# Patient Record
(demographics unavailable — no encounter records)

---
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
## 2017-12-29 NOTE — PDOC.PN
- Subjective


Encounter Start Date: 12/29/17


Encounter Start Time: 11:24





Ms. Morris was seen today in follow-up of chest pain. She says she feels much 

better, the chest pain has completely resolved. She denies any shortness of 

breath. she says she feels normal.





- Objective


Resuscitation Status: 


 











Resuscitation Status           FULL:Full Resuscitation














MAR Reviewed: Yes


Vital Signs & Weight: 


 Vital Signs (12 hours)











  Temp Pulse Resp BP Pulse Ox


 


 12/29/17 09:17  98.7 F  59 L  16  


 


 12/29/17 07:07  98.7 F  59 L  16  131/72  94 L


 


 12/29/17 03:15  98.9 F  62  18  118/56 L  96








 Weight











Weight                         150 lb 8 oz














I&O: 


 











 12/28/17 12/29/17 12/30/17





 06:59 06:59 06:59


 


Intake Total  240 


 


Balance  240 











Result Diagrams: 


 12/29/17 03:53





 12/29/17 03:53





Phys Exam





- Physical Examination


HEENT: PERRLA


Respiratory: no wheezing, no rales, no rhonchi, clear to auscultation bilateral


Cardiovascular: RRR, no significant murmur


Gastrointestinal: soft, non-tender, positive bowel sounds


Musculoskeletal: no edema





Dx/Plan


(1) Chest pain


Code(s): R07.9 - CHEST PAIN, UNSPECIFIED   Status: Acute   





(2) Aortic stenosis, moderate


Code(s): I35.0 - NONRHEUMATIC AORTIC (VALVE) STENOSIS   Status: Chronic   





(3) Hypertension


Code(s): I10 - ESSENTIAL (PRIMARY) HYPERTENSION   Status: Chronic   





- Plan





* Chest Pain- resolved. This is atypical, and suspected musculo-skeletal- 

troponins are negative


* Await Echo


* HTN- blood pressure is stable


* Likely discharge later today.

## 2018-01-18 NOTE — EKG
Test Reason : CHEST PAIN

Blood Pressure : ***/*** mmHG

Vent. Rate : 061 BPM     Atrial Rate : 061 BPM

   P-R Int : 182 ms          QRS Dur : 132 ms

    QT Int : 510 ms       P-R-T Axes : 052 074 012 degrees

   QTc Int : 513 ms

 

Normal sinus rhythm

Non-specific intra-ventricular conduction block

T wave abnormality, consider inferior ischemia

Abnormal ECG

 

Confirmed by MARSHA LUCAS ([id number]),  JAQUELINE OCHOA ([id number]) on 1/18/2018 3:24:41 PM

 

Referred By:             Confirmed By:MARSHA LUCAS

## 2018-04-02 NOTE — EKG
Test Reason : 

Blood Pressure : ***/*** mmHG

Vent. Rate : 062 BPM     Atrial Rate : 062 BPM

   P-R Int : 180 ms          QRS Dur : 130 ms

    QT Int : 478 ms       P-R-T Axes : 069 088 042 degrees

   QTc Int : 485 ms

 

Normal sinus rhythm

Non-specific intra-ventricular conduction block

Nonspecific T wave abnormality

Abnormal ECG

When compared with ECG of 28-DEC-2017 19:02,

No significant change was found

Confirmed by KANIKA LEW M.D. ([id number]) on 4/2/2018 10:09:30 PM

 

Referred By:  ANA           Confirmed By:KANIKA LEW M.D.

## 2018-04-17 NOTE — CON
DATE OF CONSULTATION:  04/17/2018

 

HISTORY OF PRESENT ILLNESS:  Ms. Morris is an 83-year-old woman, who was brought in for elective car
diac catheterization today.  She has history of aortic stenosis, which has been followed by Dr. Margarita min in [place].  She had a recent echo in 12/2017, which showed a moderate to severe aortic stenosis
 and an ejection fraction of 55% to 60%.  Since that time, she has had progressive shortness of breat
h.  She was brought in today for cardiac catheterization, which revealed severe LAD and OM branch mia
noses.  Her valve data at the time of catheterization showed a peak gradient of 49, a mean gradient o
f 41, and an aortic valve area of 0.42.  I have been asked to see her to discuss aortic valve replace
ment and coronary artery bypass grafting.

 

PAST MEDICAL HISTORY:

1.  Hypertension.

2.  Aortic stenosis.

3.  Coronary artery disease.

 

PAST SURGICAL HISTORY:

1.  Tonsillectomy.

2.  Bilateral greater saphenous vein stripping for varicose veins.

 

MEDICATIONS AT HOME:

1.  Amlodipine 5 mg every day.

2.  Hydrochlorothiazide 12.5 mg daily.

3.  Quinapril 20 mg every day.

 

ALLERGIES:  None.

 

SOCIAL HISTORY:  She lives at home in [facility] with her .  Her daughter lives across the street.
  She continues to do most of her daily activities until shortness of breath has begun to limit her a
ctivity.

 

REVIEW OF SYSTEMS:  Ten point review of systems is performed and is negative except as above.

 

PHYSICAL EXAMINATION:

GENERAL:  This is a well-developed, well-nourished woman resting comfortably post-catheterization.

VITAL SIGNS:  Her heart rate is 78 and regular, blood pressure is 120/70.

HEENT:  Sclerae nonicteric.  Pupils equal and round bilaterally.

NECK:  Supple.  There is no adenopathy.

LUNGS:  Chest is clear bilaterally.

HEART:  Rhythm is regular.  She has a harsh systolic ejection murmur heard throughout precordium.

ABDOMEN:  Soft and nontender.

EXTREMITIES:  No cyanosis, clubbing or edema.

VASCULAR:  She has palpable carotid, radial, femoral and dorsalis pedis pulses bilaterally.  She does
 have scars from greater saphenous vein stripping in the past.  Radial artery Aries's test was perfor
med in the left arm and her left radial artery is adequate for harvest with good ulnar flow.

 

LABORATORY DATA:  Of note, hemoglobin is 12.0, platelet count 204,000.  Creatinine is 0.9, potassium 
is 3.5.  PT/INR is 1.1.  I have reviewed her chest x-ray, which is clear with no dominant lung mass. 
 She does have a thin mediastinum.

 

ASSESSMENT AND PLAN:  This is a pleasant 83-year-old woman with severe aortic stenosis and 2-vessel c
oronary artery disease.  I have discussed aortic valve replacement with bioprosthetic valve and coron
adriana artery bypass grafting, probably with a mammary artery to left anterior descending and left radia
l artery to her obtuse marginal.  Risks, benefits, and options have been outlined with her and she is
 agreeable to proceed.

## 2018-04-24 NOTE — RAD
RADIOGRAPH CHEST 1 VIEW:

 

Date:  04/24/2018

Time:  11:21 a.m.

 

HISTORY:

An 83-year-old female, status post open heart surgery.

 

COMPARISON:

12/28/2017

 

FINDINGS:

Multiple new placements:  Prosthetic cardiac valve, sternotomy wires, one or two left paramedian ches
t tubes, right subclavian central venous catheter with the distal tip overlying the right atrium, and
 endotracheal tube in mid thoracic trachea, approximately 3.5 to 4 cm superior to the anjana.  No pul
monary edema.  New focus of subsegmental atelectasis in the left upper lobe.  The rest of the lungs a
re clear.  This is a supine image, which would be insensitive for pneumothorax detection.  There is a
nother new finding of mild elevation of the left hemidiaphragm.

 

IMPRESSION:

1.  Status post open heart surgery with placement of new prosthetic cardiac valve.

2.  Life support lines, as listed above.

3.  Other than subsegmental atelectasis of the left upper lobe, the lungs are clear.

4.  New mild elevation of left hemidiaphragm.

 

HAFSA []

 

POS: PATTI

## 2018-04-24 NOTE — OP
DATE OF OPERATION:  04/24/2018

 

PREOPERATIVE DIAGNOSES:  Aortic stenosis/coronary artery disease.

 

POSTOPERATIVE DIAGNOSES:  Aortic stenosis/coronary artery disease.

 

PROCEDURES:

1.  Aortic valve replacement with #19 Magna bioprosthetic valve.

2.  Coronary artery bypass grafting x2 - left internal mammary artery 1.25-mm LAD - good conduit targ
et.

3.  Left radial artery 1.25-mm OM1 - good conduit target.

4.  Left radial artery harvest.

 

SURGEON:  Dr. Eliazar Acharya, Dr. Maury Lee.

 

ANESTHESIA:  General endotracheal - Dr. Kale Draper.

 

PUMP TIME:  98 minutes.

 

CROSS-CLAMP TIME:  80 minutes.

 

LOW CORE TEMPERATURE:  32-degree Celsius.

 

PERFUSIONIST:  Vidya.

 

DRAINS:  24-[place] chest tubes x2.

 

DRIPS:  Levophed at 9 mcg per minute.

 

TRANSFUSIONS:  One platelet pack.

 

DESCRIPTION OF PROCEDURE:  After consent was obtained, patient was brought to operating room and plac
ed in supine on the operating room table.  Appropriate anesthetic monitor was placed and general endo
tracheal anesthesia induced.  Chest, abdomen, and legs and left arm were prepped and draped in usual 
sterile fashion.  Patient had previously undergone bilateral greater saphenous vein stripping.

 

Left radial artery was harvested as a pedicle graft.  Wounds were irrigated and closed in layers.  St
aples were placed in the skin due to the thin nature of her epidermis.  Sterile dressing was applied 
and the arm tucked.  Median sternotomy was performed.  Left internal mammary artery was harvested as 
a pedicle graft.  Patient was systemically heparinized.  Distal pedicle was divided and infused with 
papaverine.  Thymic fat and pericardium were divided with electrocautery.  Pericardial stay sutures w
ere placed.  Aortic and atrial cannulation performed.  After adequate heparinization, retrograde prim
e was performed and patient was placed on cardiopulmonary bypass.  Left ventricular sump drain was pl
aced to the right superior pulmonary vein.  Distal targets were marked.  Aortic cross-clamp was appli
ed, antegrade sanguinous cardioplegic arrest obtained.  One liter of antegrade cold cardioplegia was 
given.  Topical cold solution was used.  Left radial artery was anastomosed to the OM1 in end-to-side
 fashion with running 7-0 Prolene suture.  Pedicle screw with interrupted 6-0 Prolene suture.  Mammar
y artery was brought through a window in the pericardium and anastomosed to the distal LAD in end-to-
side fashion with running 7-0 Prolene suture.  Anastomosis was briefly tested and was hemostatic.  Pe
dicle screw was interrupted with 6-0 Prolene suture.  A 500 mL of antegrade [place] cardioplegia was
 given.  A transverse hockey stick aortotomy was performed.  Aortic stay sutures were placed.  Valve 
was inspected.  It was a three-leaflet valve that was heavily calcified.  Leaflets were debrided and 
annulus decalcified.  Annulus measured at #19.  A 19-Magna bioprosthetic valve was selected and washe
d.  Pledgeted 2-0 Ethibond sutures were placed in the annulus.  These sutures were passed through the
 sewing ring and the valve was seated.  Valve was secured with 4 knots.  Valve seated nicely and core
 knots were all secured adequately.  The CO2 was infused in the pericardial well throughout the open 
portion of the procedure.  Aortotomy was closed in 2-layer running fashion with pledgeted 4-0 Prolene
 suture.  Aortotomy was then treated with BioGlue.  Slit was made in the aorta.  The radial artery wa
s anastomosed to the aorta with running 7-0 Prolene suture.  After adequate deairing, patient was nam
liza in Trendelenburg position.  Cross-clamp was removed.  Ventricular pacing wires were placed.  The 
patient was warmed and weaned from cardiopulmonary bypass.  After resumption of sinus rhythm, good he
modynamics, temperature greater than 36.5, bypass was discontinued.  Transfusions were given.  Decann
ulation was performed and pursestring sutures secured.  Atrial and aortic cannulation sites were secu
red with their pursestrings along with a pledgeted at 4-0 Prolene suture.  Aortic roots, vent site wa
s secured with pledgeted 4-0 Prolene suture.  Sump was removed and its pursestring sutures secured.  
Protamine was administered.  Hemostasis was ensured.  Platelet pack was administered.  A 24-[place] st
raight chest tubes were placed in mediastinum x2.  After adequate hemostasis had been obtained, smith
um was treated with vancomycin paste.  Sternum was closed with #5 wire.  Sternum was treated with nam
telet-rich plasma and wires twisted.  Wounds irrigated and treated with platelet-poor plasma, closed 
in multiple layers.  Needle, sponge, and instrument counts were all reported as correct at the end of
 the procedure.  Patient was transferred to the intensive care unit in stable, but critical condition
.

## 2018-04-24 NOTE — EKG
Test Reason : POST CABG

Blood Pressure : ***/*** mmHG

Vent. Rate : 087 BPM     Atrial Rate : 087 BPM

   P-R Int : 166 ms          QRS Dur : 132 ms

    QT Int : 402 ms       P-R-T Axes : 055 078 090 degrees

   QTc Int : 483 ms

 

Normal sinus rhythm

Right bundle branch block

ST elevation, consider early repolarization, pericarditis, or injury

Abnormal ECG

Confirmed by PJ ALEMAN ([id number]) on 4/24/2018 3:56:34 PM

 

Referred By: MICHELLE ADEN           Confirmed By:PJ ALEMAN

## 2018-04-25 NOTE — CON
DATE OF CONSULTATION:  04/24/2018

 

REASON FOR CONSULTATION:  Assist with cardiac management status post AVR and bypass surgery.

 

HISTORY OF PRESENT ILLNESS:  Ms. Morris is a pleasant 83-year-old woman who I have seen and evaluate
d in the past.  She has a history of CAD with a recent angiogram performed  last week where she was f
ound to have severe stenosis of the LAD and OM branch.  She was also found to have a severe aortic st
enosis with an aortic valve area of 0.42.

 

She underwent 2-vessel bypass and AVR with a 19 mm valve.  She was seen and evaluated in the postoper
ative period.  She is currently extubated.  Her only complaint is constipation.  She is on low dose n
orepinephrine.  Her pacemaker leads have been removed and her chest tube has also been removed.

 

PAST MEDICAL HISTORY:  Hypertension.

 

FAMILY HISTORY:  Negative for CAD.

 

SOCIAL HISTORY:  She is currently  with children.  No current tobacco or alcohol use.

 

CURRENT MEDICATIONS:  Hydrochlorothiazide, MiraLax, melatonin, vitamin C, CoQ10, calcium, aspirin, fo
lic acid, amlodipine, and omega 3 fatty acid, Accupril, and isosorbide.

 

REVIEW OF SYSTEMS:  Ten point review of systems reviewed and as above, otherwise negative.

 

PHYSICAL EXAMINATION:

VITAL SIGNS:  Blood pressure 117/75, pulse 77, temperature afebrile.

GENERAL:  Patient is a pleasant female who is in no acute distress. The patient appears her stated ag
e.

NEUROLOGIC:  The patient is alert and oriented times 3 with no focal neurologic deficits.

HEENT:  Sclerae without icterus.  Mouth has moist mucous membranes with normal pallor.

NECK:  No JVD.  Carotid upstroke brisk.  No bruits bilaterally.

LUNGS:  Clear to auscultation with unlabored respirations.

BACK:  No scoliosis or kyphosis.

CARDIAC:  Regular rate and rhythm with a pericardial rub. 

ABDOMEN:  Soft, nontender, nondistended.  No peritoneal signs present. No hepatosplenomegaly.  No abn
ormal striae.

EXTREMITIES:  2+ femoral and 2+ dorsalis pedis pulses.  No cyanosis, clubbing, or edema.

SKIN:  No gross abnormalities.

 

PERTINENT LABS:  Hemoglobin 9.7, creatinine 0.85.

 

IMPRESSION:

1.  Coronary artery disease.

2.  Severe AS.

3.  Status post aortic valve replacement and bypass surgery.

 

RECOMMENDATIONS:  Ms. Morris continues to be on low dose pressor agents.  We will defer beta blocker
s until she is off pressor agents and is maintaining appropriate blood pressure.  She currently has D
ulcolax p.r.n. for constipation.  Continue aspirin.
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
## 2018-04-25 NOTE — RAD
CHEST ONE VIEW:

 

History: Open heart surgery. 

 

Comparison: Chest radiograph prior day. 

 

FINDINGS: 

Patient has been extubated. Right IJ central venous catheter is similar. There are atelectatic change
s in the left lung. No large pneumothorax is appreciated. 

 

IMPRESSION: 

Expected post-operative findings without complication. 

 

POS: [facility]

## 2018-04-26 NOTE — PRG
DATE OF SERVICE:  04/26/2018

 

Ms. Morris said she is feeling well.  She is sitting up in a chair.  

 

PHYSICAL EXAMINATION: 

VITAL SIGNS:  She is afebrile.  She is on 2 liter cannula.  Heart rate 78,  blood pressure 101/61.

LUNGS:  Remarkable for fine crackles at her bases.

HEART:  Regular rhythm.  S1 and S2 are normal.  She does not have a loud S3 consistent with her biopr
osthetic valve.  

ABDOMEN:  Abdomen is soft and nontender.

EXTREMITIES:  Warm without edema.

 

LABORATORY DATA:  White count 10.9, hemoglobin 9.7, platelets 85,000.

 

Sodium 134, creatinine 4.8, chloride 108, bicarbonate 20, BUN 39, creatinine 2.11.

 

IMPRESSION:

1.  Status post aortic valve and coronary bypass grafting.

2.  Renal insufficiency.  Her intake and output is positive 595.

 

Her urine output has only been 5 mL an hour since midnight.

 

She probably needs more volume.  This will need to be monitored closely.

## 2018-04-26 NOTE — RAD
CHEST 1 VIEW:

 

Date:  04/26/18 

 

HISTORY:  

Chest surgery. Follow-up. 

 

COMPARISON:  

04/25/18. 

 

FINDINGS:

Cardiac silhouette is magnified and enlarged. Pulmonary vasculature is upper limits of normal. Opacit
y at the right base has increased slightly with the appearance of pleural fluid. Bibasilar atelectasi
s is otherwise stable. Mediastinum is midline with postoperative changes and a right internal jugular
 central venous catheter. No lobar consolidation or evidence of pneumothorax. 

 

IMPRESSION: 

Slight interval increase in right pleural fluid. Otherwise stable postoperative appearance of the yifan
st. 

 

 

POS: [facility]

## 2018-04-26 NOTE — PRG
DATE OF SERVICE:  04/26/2018

 

SUBJECTIVE:  Ms. Morris is doing well.  She was seen ambulating.  No current complaints.

 

PHYSICAL EXAMINATION:

VITAL SIGNS:  Blood pressure 124/67, pulse 69, temperature 97.8.

LUNGS:  Clear to auscultation.

HEART:  Regular rate and rhythm.

ABDOMEN:  Soft, nontender, nondistended.

EXTREMITIES:  No edema.

 

IMPRESSION:

1.  Coronary artery disease status post bypass surgery.

2.  Aortic stenosis, status post aortic valve replacement.

 

RECOMMENDATIONS:  Patient currently on aspirin.  We will add low dose beta blocker therapy and statin
 treatment.  Continue ambulation and incentive spirometry.

## 2018-04-27 NOTE — PDOC.CTH
Cardiology Progress Note





- Subjective





No complaints today. In bed about to get to chair. 





- ROS


shortness of breath





- Objective


 Vital Signs











  Temp Pulse Resp Pulse Ox


 


 04/27/18 08:00  98.4 F  79  19  93 L


 


 04/27/18 07:20     94 L


 


 04/27/18 04:00  97.9 F   








 











Weight                         164 lb 7.437 oz














 











 04/26/18 04/27/18 04/28/18





 06:59 06:59 06:59


 


Intake Total 1045 508.4 200


 


Output Total 450 168 530


 


Balance 595 340.4 -330














- Physical Examination


General/Neuro: alert & oriented x3, NAD


Lungs: other: (decreased BS at bases; no wheezing or rhonchi)


Heart: RRR


Abdomen: NT/ND


Extremities: + edema B





- Telemetry


Telemetry Rhythm: SR 70s-90s





- Labs


Result Diagrams: 


 04/27/18 04:40





 04/27/18 04:40





- Assessment/Plan





1. CAD s/p CABG x 2 with LIMA-LAD and left radial to D1


2. Severe AS s/p AVR


3. DEVON - Off vasopressin and with minimal urine output. Continue to monitor. 

Lasix given x 1 today.


4. Post-op AFib - after given dopamine. Now remains in NSR





Slow progress. Rhythm stable. Third spaced, but now with improved urine output 

after lasix. Continue to monitor closely. Up to chair.

## 2018-04-27 NOTE — PQF
CLINICAL DOCUMENTATION IMPROVEMENT CLARIFICATION FORM:  ICD-10 Updated



PLEASE DO AN ADDENDUM TO THE PROGRESS NOTE WITH ANY DOCUMENTATION UPDATES OR 
ADDITIONS AND CARRY THROUGH TO DC SUMMARY.   THANK YOU.



DATE:  4/27                                                                     
ATTN:  DR. SAMMIE ADEN



Please exercise your independent, professional judgment in responding to the 
clarification form. Clinical indicators are provided on the bottom of this form 
for your review



Please check appropriate box(s):



[ x ] Acute Renal Failure (ARF) / Acute Kidney Injury (VIOLA) 

         (Please specify associated condition, if applicable)

   [  x] Acute Tubular Necrosis (ATN) 

   [  ] Other Etiology or underlying conditions related to the diagnosis of ARF
/ VIOLA: ________________  

[  ] Acute on Chronic Renal Failure please specify Stage of CKD ________ (see 
below)



[  ] Other diagnosis ___________

[  ] Unable to determine



National Kid[employer] Guidelines for CKD Staging

Stage I Kidney damage with normal or increased GFR   GFR > 90

Stage II Kidney damage with mildly decreased GFR   GFR 60-89

Stage III Kidney damage with moderately decreased GFR   GFR 30-59

Stage IV Kidney damage with severely decreased GFR   GFR 16-29

Stage V Kidney failure         GFR<15

ESRD End Stage Renal Disease         On dialysis

__________________________________________________________________

Acute Renal Failure/Acute Kidney Failure defined as:

Increases in SCr by (>) 0.3 mg/dl within 48 hours OR-

Increases in SCr by (>) 1.5 times baseline, known or presumed to have occurred 
within the prior 7 days OR-

Urine volume < 0.5 ml/kg/hour for 6 hours (KDIGO supplement 2012 for RIFLE/AKIN 
criteria)



For continuity of documentation, please document condition throughout progress 
notes and discharge summary.  Thank You.



CLINICAL INDICATORS - SIGNS / SYMPTOMS / LABS



ATTENDING PN 4/25:  UOP MARGINAL;         PN 4/27:  UOP REMAINS MARGINAL (10-15 
/ HR); CREAT 2.6



PULMONOLOGY PN 4/26:  LAB DATA:  BUN 39, CR 2.11         IMPRESSION:  2)  RENAL 
INSUFFICIENCY.  ...SHE PROBABLY NEEDS MORE VOLUME.  THIS WILL NEED TO BE 
MONITORED CLOSELY.



BUN:  14           CR:  0.66            GFR:  86      (4/24, POST-OP)

          21                   0.85                     64      (4/25)

          39                   2.11                     22      (4/26)

          62                   2.64                     17      (4/27)



RISK FACTORS:

S/P AVR & CABG X2 (4/24)

MARGINAL URINE OUTPUT



TREATMENTS:

VASOPRESSIN (4/26 - PRESENT)

MONITORING URINE OUTPUT

SERIAL BASEMET





THANK YOU!  Kenyatta



(This form is maintained as a part of the permanent medical record)

 2015 [employer].  All Rights Reserved

Kenyatta Griffin RN, BSN    [email or web address]    Office:  [phone]

                                                              

[facility]

## 2018-04-27 NOTE — PRG
DATE OF SERVICE:  04/27/2018

 

SUBJECTIVE:  Ms. Nelida Morris is doing better.  Blood pressure is up to 160-170 range with the add
ition of vasopressin yesterday.

 

OBJECTIVE:

VITAL SIGNS:  Oximetry is 94.  She is on nasal cannula.  Blood pressure is 139/79, heart rate is 58.

LUNGS:  Remarkable for fine crackles at her bases.

HEART:  Regular rhythm.

ABDOMEN:  Soft.

 

LABORATORY DATA:  White count 11.8, hemoglobin 9.8, platelets 60,000.

 

Sodium 132, potassium is 5.4, chloride 106, bicarbonate 16, BUN 62, creatinine 2.64.  Urine output ha
s fluctuated as high as 20 mL this morning, it was 5-13 mL an hour overnight.

 

ASSESSMENT AND PLAN:  Probably should check her potassium this afternoon.  Make sure she is not more 
hyperkalemic or more acidemic.  She was given a dose of Lasix this morning.

 

She is back in sinus rhythm.  She had atrial fibrillation with a dopamine.  She will remain in critic
al Care Unit.

## 2018-04-28 NOTE — PRG
DATE OF SERVICE:  04/28/2018

 

SERVICE:  Pulmonary Medicine.

 

INTERVAL HISTORY:  The patient went into atrial fibrillation overnight.  Otherwise, there were no len
nts.  She got a dose of digoxin.  She remains in it.  Blood pressures are okay.  She seems to be tole
rating for the time being.  That being said, in an another 20-30 minutes, she is scheduled to get an 
another dose of digoxin.  She denies any chest discomfort.  She was unable to sleep last night until 
early this morning.  She got Benadryl.  It seem to knock around.  She got a little bit of rest.

 

PHYSICAL EXAMINATION:

VITAL SIGNS:  Afebrile, pulse 84, blood pressure 93/60, respirations 19, saturation 94% on 2 liters n
xochilt cannula.

GENERAL:  The patient is awake, alert, in no apparent distress.

LUNGS:  Decent air entry with no prolonged expiratory phase, wheezing, rhonchi or crackles.

HEART:  Normal rate.  Irregular.

ABDOMEN:  Soft, nontender, nondistended.  Bowel sounds are positive.

MUSCULOSKELETAL:  No cyanosis or clubbing.  There is no pitting in the bilateral lower extremities.

NEUROLOGIC:  Grossly nonfocal.

 

LABORATORY DATA:  WBC 11.0, hemoglobin 9.4, platelets 58,000 and roughly stable.  INR 1.5.  Creatinin
e 1.96 and down trending, BUN 67.  Basic metabolic profile is otherwise unremarkable.

 

ASSESSMENT:

1.  Acute hypoxic respiratory failure.

2.  Coronary artery bypass graft, postoperative day #04.

3.  Atrial fibrillation with rapid ventricular response.

4.  Acute kidney injury, improving.

 

PLAN:  We will continue our supportive care including rate control medications.  From my perspective,
 she is stable for transition to the telemetry unit.  Pulmonary and Critical Care will continue to fo
llow for the time being.

## 2018-04-29 NOTE — RAD
PORTABLE CHEST:

 

DATE: 4/29/18.

 

PROVIDED CLINICAL HISTORY: 

Post open heart.

 

FINDINGS: 

Comparison 4/26/18.  Cardiac and mediastinal silhouette are unchanged in appearance.  Right-sided yasmeen
tral line, median sternotomy changes, and atherosclerosis are redemonstrated.  Bibasilar pleural pare
nchymal opacities appear similar to the prior study.  No evidence for pneumothorax.

 

IMPRESSION: 

Stable radiographic appearance of the chest.

 

POS: [facility]

## 2018-04-30 NOTE — PRG
DATE OF SERVICE:  04/30/2018

 

SUBJECTIVE:  Ms. Morris went into atrial fibrillation today.  Her rate is controlled.  She has no cu
rrent symptoms.  She is slowly improving.

 

OBJECTIVE:

VITAL SIGNS:  Blood pressure 103/70, pulse 79, temperature 98.

LUNGS:  Clear to auscultation.

HEART:  Irregularly irregular.

ABDOMEN:  Soft, nontender, nondistended.

EXTREMITIES:  No edema.

 

PERTINENT LABORATORY DATA:  Hemoglobin 9.7, creatinine down to 1.02.

 

IMPRESSION:

1.  Coronary artery disease, status post bypass surgery.

2.  Status post aortic valve replacement.

3.  Postoperative atrial fibrillation.

 

RECOMMENDATIONS:  The patient's heart rate appears to be well controlled.  We will discuss with Dr. DOM marrufo tomorrow about anticoagulation therapy.  She may benefit from anticoagulation treatment at least
 for the next month.  She is currently on metoprolol 12.5 b.i.d. and we will increase to 25 b.i.d.

## 2018-05-01 NOTE — PRG
DATE OF SERVICE:  04/29/2018

 

SERVICE:  Pulmonary Medicine.

 

INTERVAL HISTORY:  The patient is doing a little better today.  Her oxygen requirements have improved
 a little bit.  Her strength is also improving a touch.  She denies any chest pain, nausea, vomiting,
 or difficulty with breathing.

 

PHYSICAL EXAMINATION:

VITAL SIGNS:  Afebrile.  Pulse 77, blood pressure 116/71, respirations 22, and saturation 92% on 3 li
ters nasal cannula.

GENERAL:  Patient is awake, alert, in no apparent distress.

LUNGS:  There was excellent air entry with no prolonged expiratory phase or wheezing present.  Depend
ent crackles are minimal.

HEART:  Normal rate, regular.

ABDOMEN:  Soft, nontender, nondistended.  Bowel sounds are positive.

MUSCULOSKELETAL:  No cyanosis or clubbing.  No pitting in the bilateral lower extremities.

NEUROLOGIC:  Grossly nonfocal.

 

LABORATORY DATA:  WBC 10.0, hemoglobin 9.2, platelets 76,000 and up trending.  Creatinine continues t
o improve to 1.29, BUN 58.  Basic metabolic profile is otherwise stable/unremarkable.

 

IMAGING:  Chest x-ray demonstrates stable radiographic appearance of the chest.  There is likely bila
teral pleural effusions.  Cardiac silhouette is slightly generous.  This is a portable film however. 
 Sternotomy wires are once again noted.  There is a right-sided subclavian central venous catheter th
at terminates in very good position.

 

ASSESSMENT:

1.  Acute hypoxic respiratory failure.

2.  Coronary artery bypass graft and aortic valve replacement, postop day #5.

3.  Atrial fibrillation with right ventricular response, currently rate controlled.

4.  Acute kidney injury, resolving.

5.  Bilateral pleural effusions, small.

 

PLAN:  I will continue to diurese the patient to euvolemia.  We will continue mobilization efforts.  
Billy catheter can be removed today.  Pulmonary from my perspective, she is stable for transition out
 of the ICU.  Pulmonary will continue to follow along if she remains in the hospital for the time aline vora.  Dr. Childress will resume care in the morning.

## 2018-05-01 NOTE — PRG
DATE OF SERVICE:  05/01/2018

 

SUBJECTIVE:  Ms. Morris remains in atrial fibrillation.  She has no current symptoms.  She states sh
e has been ambulating to the bathroom on her own.

 

OBJECTIVE:

VITAL SIGNS:  Blood pressure 110/65, pulse 85, respirations 20.

LUNGS:  Clear to auscultation.

CARDIAC:  Irregularly irregular.

ABDOMEN:  Soft, nontender, nondistended.

EXTREMITIES:  No edema.

 

IMPRESSION:

1.  Postop atrial fibrillation.

2.  Status post aortic valve replacement.

3.  Status post bypass surgery.

 

RECOMMENDATIONS:  I would recommend anticoagulation therapy prior to discharge.  She may convert back
 to sinus rhythm.  I would also add amiodarone 400 mg q. b.i.d. until discharge, then decrease to 400
 mg q.a.m. x1 month.  Otherwise, continue beta blocker therapy and statin treatment.

## 2018-05-02 NOTE — PDOC.CTH
Cardiology Progress Note





- Subjective





Patient c/o feeling weak, but much better overall. Denies any CP. Mild MANCINI, but 

improving.





- ROS


shortness of breath





- Objective


 Vital Signs











  Temp Pulse Pulse Pulse Resp BP BP


 


 05/02/18 12:03  98.3 F  74    17  


 


 05/02/18 08:55    119 H  103 H   116/55 L  121/60


 


 05/02/18 07:27  98.0 F  99    20  


 


 05/02/18 04:00  98.3 F  94    20  














  BP Pulse Ox Pulse Ox Pulse Ox


 


 05/02/18 12:03  113/65  92 L  


 


 05/02/18 08:55    96  90 L


 


 05/02/18 07:27  105/64  94 L  


 


 05/02/18 04:00  102/76   








 











Weight                         168 lb














 











 05/01/18 05/02/18 05/03/18





 06:59 06:59 06:59


 


Intake Total 1280 1200 


 


Output Total 1300 900 


 


Balance -20 300 














- Physical Examination


General/Neuro: alert & oriented x3, NAD


Neck: no JVD present


Lungs: CTA, other: (decreased BS at bases )


Heart: RRR


Abdomen: NT/ND


Extremities: other: (no edema)


Other PE findings: Sternal wound and graft sites healing well without erythema 

or drainage.





- Telemetry


Telemetry Rhythm: SR





- Labs


Result Diagrams: 


 05/02/18 11:24





 05/02/18 11:24





- Assessment/Plan





1. CAD s/p CABG x 2


2. Severe AS s/p AVR


3. Post-op AFib - only after given dopamine


4. DEVON - resolved





Overall doing well. Still weak. Continue PT/cardiac rehab. Start discharge 

planning.

## 2018-05-02 NOTE — DIS
DIAGNOSES:

1.  Aortic stenosis.

2.  Coronary artery disease.

3.  Hypertension.

4.  Dyslipidemia.

 

PROCEDURES:

1.  Aortic valve replacement with a #19 Magna bioprosthetic valve.

2.  Coronary bypass grafting x2 - 1) left internal mammary artery to LAD, 2)  left radial artery to O
M1.

 

DESCRIPTION OF HOSPITAL STAY:  Ms. Morris is an 83-year-old woman who was admitted for AVR CABG elec
tively.  Postoperatively, she has done about as well as you could expect from a diminutive elderly wo
man after such a large procedure.  She has had atrial fibrillation runs twice and has been started on
 amiodarone.  She has also been anticoagulated with Eliquis for the short near term.  Other than the 
arrhythmia she has had no complications after surgery.  She was evaluated for rehab and she declined 
inpatient rehab.  She is being discharged to home with multiple daughters to help care for her.  At t
he time of discharge, she is ambulatory, tolerating a regular diet, having good bowel and bladder fun
ction.  Incisions are clean and dry without evidence of infection.

 

DISCHARGE MEDICATIONS:

1.  Aspirin 81 mg every day.

2.  Eliquis 2.5 mg b.i.d.

3.  Amiodarone 400 mg daily.

4.  Lipitor 40 mg at bedtime.

5.  HCTZ 12.5 mg every day.

6.  Lopressor 25 mg b.i.d.

7.  Ultram 50 mg q.6 hours p.r.n. pain.

 

FOLLOWUP:  Follow up is with me in 2 weeks and Dr. Cueto in a month.

## 2018-05-03 NOTE — PQF
GEETA GILLESPIE CHARLES H MD

[id number]                                                             [facility]

[id number]                             

                                   

CLINICAL DOCUMENTATION CLARIFICATION FORM:  POST DISCHARGE



Addendum to original discharge summary date:  5/2/2018









DATE:   5/3/18                                         ATTN:   Dr. Acharya



Please exercise your independent, professional judgment in responding to the 
clarification form. 

Clinical indicators are provided on the bottom of this form for your review



Please check appropriate box(s):

[  ] Postoperative atrial fibrillation is a postoperative complication related 
to current/recent surgery



[  ] Acute hypoxic respiratory failure is a postoperative complication related 
to current/recent surgery



[  ] Postoperative atrial fibrillation is not a postoperative complication 
related to current/recent surgery



[  ] Acute hypoxic respiratory failure is not a postoperative complication 
related to current/recent surgery



[  ] Other diagnosis (please specify) 



[ x ] Unable to determine



In addition, please specify:

Present on Admission (POA):  [  ] Yes             [  ] No             [  ] 
Unable to determine





CLINICAL INDICATORS - SIGNS / SYMPTOMS / LABS

Per 5/1 progress note:  Postop atrial fibrillation.  

Per 4/29 progress note:  Acute hypoxic respiratory failure.  Saturation 92% on 
3 liters nasal cannula.  

Per 4/30 progress notes:  Postoperative atrial fibrillation.  

Per 4/28 progress notes:  The patient went into atrial fibrillation overnight.  
Atrial fibrillation with rapid ventricular response.  Acute hypoxic respiratory 
failure.  Saturation 94% on 2 linters nasal cannula.  

Per 4/27 progress note:  She had atrial fibrillation with a dopamine.  

Per cardiology progress notes:  Post-op AFib-only after given dopamine.  Post-
op AFib-after given dopamine.  Now remains in NSR.  



RISK FACTORS (per operative report)

Aortic valve replacement and CABG x 2  on 4/24/18.  



TREATMENT (per progress notes)

Amiodarone 400 mg q b.i.d. 

3L and 2L nasal cannula. 

Digoxin.  

Beta blocker therapy and statin treatment.  





(This form is maintained as a part of the permanent medical record)

2015 [employer].  All Rights Reserved

[email or web address]    [phone]

                                                              



 



[facility]

## 2018-05-08 NOTE — RAD
TWO VIEWS OF THE CHEST:

5/8/18

 

COMPARISON:  

4/29/18

 

HISTORY: 

Nonrheumatic heart valve disorder.

 

FINDINGS:  

Two views of the chest shows an enlarged cardiomediastinal silhouette. There is a moderate left and a
 mild right pleural effusion. The patient is status post cardiac valve replacement. Degenerative nobles
ges are seen in the spine. 

 

IMPRESSION:  

1.      Cardiomegaly.

2.      Moderate left and mild right pleural effusions.

 

POS: [facility]

## 2018-05-14 NOTE — RAD
PORTABLE CHEST:

 

Date:  05/14/18 

 

HISTORY:  

Dyspnea. 

 

COMPARISON:  

05/08/18. 

 

FINDINGS:

There is a large left pleural effusion with only a small amount of aerated lung in the left upper api
savanna region. There is a moderate right pleural effusion. Cardiomegaly and postop sternotomy change. Va
scular markings upper normal. 

 

IMPRESSION: 

Cardiomegaly with bilateral effusions, larger on the left, not significantly changed when compared to
 05/08/18. 

 

 

POS: [facility]

## 2018-07-11 NOTE — RAD
PA AND LATERAL CHEST:

 

Date:  07/11/18 

 

INDICATION:

Shortness of breath and history of recent heart surgery. 

 

COMPARISON:  

Prior study dated 05/25/18. 

 

FINDINGS:

Moderate left and small right pleural effusion persists. Cardiomegaly is similar. Pulmonary vasculatu
re appears within normal limits. Midline sternotomy changes are similar appearing. Focus of calcific 
tendinosis is again seen overlying the right greater tuberosity. 

 

IMPRESSION: 

Stable exam. 

 

 

POS: [facility]

## 2021-01-07 NOTE — RAD
CHEST TWO VIEWS:

 

History: Chest pain.

 

FINDINGS: 

No comparison. Cardiac silhouette is upper limits of normal in size. Pulmonary vasculature is unremar
kable. Mediastinum is midline with aortic calcification. Lungs are hyperinflated. There is no conflue
nt airspace consolidation, pneumothorax, or pleural fluid evident. Dystrophic calcification projects 
over the right rotator cuff. Cardiac monitor leads overlie the chest. 

 

IMPRESSION: 

1. COPD. 

2. Atherosclerosis. 

3. Calcified tendinosis.

 

POS: [facility] clear